# Patient Record
Sex: MALE | Race: WHITE | NOT HISPANIC OR LATINO | URBAN - METROPOLITAN AREA
[De-identification: names, ages, dates, MRNs, and addresses within clinical notes are randomized per-mention and may not be internally consistent; named-entity substitution may affect disease eponyms.]

---

## 2024-11-21 ENCOUNTER — APPOINTMENT (RX ONLY)
Dept: URBAN - METROPOLITAN AREA CLINIC 102 | Facility: CLINIC | Age: 24
Setting detail: DERMATOLOGY
End: 2024-11-21

## 2024-11-21 DIAGNOSIS — L81.0 POSTINFLAMMATORY HYPERPIGMENTATION: ICD-10-CM

## 2024-11-21 PROCEDURE — ? COUNSELING

## 2024-11-21 PROCEDURE — ? ADDITIONAL NOTES

## 2024-11-21 PROCEDURE — 99202 OFFICE O/P NEW SF 15 MIN: CPT

## 2024-11-21 ASSESSMENT — LOCATION SIMPLE DESCRIPTION DERM
LOCATION SIMPLE: RIGHT SHOULDER
LOCATION SIMPLE: LEFT SHOULDER
LOCATION SIMPLE: LEFT ANTERIOR NECK

## 2024-11-21 ASSESSMENT — LOCATION DETAILED DESCRIPTION DERM
LOCATION DETAILED: LEFT CLAVICULAR NECK
LOCATION DETAILED: LEFT ANTERIOR SHOULDER
LOCATION DETAILED: RIGHT ANTERIOR SHOULDER

## 2024-11-21 ASSESSMENT — BSA RASH: BSA RASH: 2

## 2024-11-21 ASSESSMENT — LOCATION ZONE DERM
LOCATION ZONE: NECK
LOCATION ZONE: ARM

## 2024-11-21 ASSESSMENT — SEVERITY ASSESSMENT: SEVERITY: MILD

## 2024-11-21 NOTE — HPI: RASH
Is This A New Presentation, Or A Follow-Up?: Rash
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 The rash has since resolved with light discoloration. Patient did not treat with over the counter medication. He denies new topicals, soaps, contact with new outdoor environment. He has never experienced this rash in the past. Patient does not have pictures of active rash.

## 2024-11-21 NOTE — PROCEDURE: ADDITIONAL NOTES
Render Risk Assessment In Note?: no
Additional Notes: Return to clinic if rash returns. Recommend for patient to take photos with phone if rash returns.
Detail Level: Simple

## 2025-02-19 ENCOUNTER — APPOINTMENT (OUTPATIENT)
Dept: URBAN - METROPOLITAN AREA CLINIC 102 | Facility: CLINIC | Age: 25
Setting detail: DERMATOLOGY
End: 2025-02-19

## 2025-02-19 DIAGNOSIS — L20.89 OTHER ATOPIC DERMATITIS: ICD-10-CM

## 2025-02-19 PROCEDURE — 99213 OFFICE O/P EST LOW 20 MIN: CPT

## 2025-02-19 PROCEDURE — ? PRESCRIPTION MEDICATION MANAGEMENT

## 2025-02-19 PROCEDURE — ? COUNSELING

## 2025-02-19 PROCEDURE — ? PRESCRIPTION

## 2025-02-19 RX ORDER — TACROLIMUS 1 MG/G
OINTMENT TOPICAL
Qty: 30 | Refills: 4 | Status: ERX | COMMUNITY
Start: 2025-02-19

## 2025-02-19 RX ORDER — HYDROCORTISONE 25 MG/G
CREAM TOPICAL
Qty: 30 | Refills: 1 | Status: ERX | COMMUNITY
Start: 2025-02-19

## 2025-02-19 RX ORDER — BETAMETHASONE DIPROPIONATE 0.5 MG/G
CREAM TOPICAL
Qty: 45 | Refills: 3 | Status: ERX | COMMUNITY
Start: 2025-02-19

## 2025-02-19 RX ADMIN — TACROLIMUS: 1 OINTMENT TOPICAL at 00:00

## 2025-02-19 RX ADMIN — HYDROCORTISONE: 25 CREAM TOPICAL at 00:00

## 2025-02-19 RX ADMIN — BETAMETHASONE DIPROPIONATE: 0.5 CREAM TOPICAL at 00:00

## 2025-02-19 ASSESSMENT — LOCATION DETAILED DESCRIPTION DERM
LOCATION DETAILED: RIGHT ANTERIOR DISTAL UPPER ARM
LOCATION DETAILED: RIGHT VENTRAL PROXIMAL FOREARM
LOCATION DETAILED: RIGHT ANTERIOR PROXIMAL UPPER ARM

## 2025-02-19 ASSESSMENT — LOCATION SIMPLE DESCRIPTION DERM
LOCATION SIMPLE: RIGHT UPPER ARM
LOCATION SIMPLE: RIGHT FOREARM

## 2025-02-19 ASSESSMENT — BSA RASH: BSA RASH: 5

## 2025-02-19 ASSESSMENT — SEVERITY ASSESSMENT 2020: SEVERITY 2020: ALMOST CLEAR

## 2025-02-19 ASSESSMENT — LOCATION ZONE DERM: LOCATION ZONE: ARM

## 2025-02-19 ASSESSMENT — ITCH NUMERIC RATING SCALE: HOW SEVERE IS YOUR ITCHING?: 2

## 2025-02-19 NOTE — PROCEDURE: PRESCRIPTION MEDICATION MANAGEMENT
Plan: Counseled patient that if he is coming into contact with an irritant he will continue to flare and may need to consider patch testing with allergist. Start betamethasone, hydrocortisone and tacrolimus as directed. Follow up in 2 months.
Detail Level: Zone
Render In Strict Bullet Format?: No
Initiate Treatment: tacrolimus 0.1 % topical ointment TP Sig: Apply on affected area of face twice daily as needed when flared. Alternate with hydrocortisone.\\nQuantity: 30 g Days Supply: 30\\nbetamethasone dipropionate 0.05 % topical cream TP Sig: Apply on affected area of body twice daily for 2 weeks on, 2 weeks off as needed for flare of eczema. DO NOT APPLY ON FACE\\nQuantity: 45 g Days Supply: 15\\nhydrocortisone 2.5 % topical cream TP Sig: Apply twice daily on affected area of face for 2 weeks on, 2 weeks off as needed for flare of eczema.\\nQuantity: 30 g Days Supply: 30

## 2025-02-19 NOTE — HPI: RASH
What Type Of Note Output Would You Prefer (Optional)?: Bullet Format
Is This A New Presentation, Or A Follow-Up?: Rash
Additional History: Patient returns to clinic with resolving rash. Patient has pictures in the phone of when the flare started. Lesions are red, scaly and itching on the arms, neck and face. 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 He does not know of anything new on the boat that he may be coming into contact with and he does not have a history of eczema.